# Patient Record
(demographics unavailable — no encounter records)

---

## 2024-11-04 NOTE — HISTORY OF PRESENT ILLNESS
[FreeTextEntry1] : 62YO male with history of TIA and hypertension is referred today from neurology for cardiovascular evaluation. Patient had an episode of headache and LLE numbness in May 2024, during which time he was hospitalized at [facility] for 24 hours. His MRI did not show any acute infarct, but did show evidence of chronic small vessel disease. A few months later, he was hospitalized at an OSH in [place] for an episode of dizziness; at that time, his CVA work up was negative, and his symptoms were attributed to vertigo. He has remained on aspirin 81mg QD, however was taken off a statin due to an allergic reaction. He is also on metoprolol for his blood pressure, which is fairly well controlled. He denies any snoring, day time fatigue, chest pain, dyspnea, palpitations, smoking history, alcohol use, illicit drug use.  Last visit LDL > goal, given allergy to statin, added ezetimibe 10mg QD but he did not start  PMD changed metop to HCTZ due to fatigue

## 2024-11-04 NOTE — ASSESSMENT
[FreeTextEntry1] : EKG NSR normal EKG Zio neg for AFib   A/P 60YO male with history of TIA and hypertension is referred today from neurology for cardiovascular evaluation.  #History of TIA # hyperlipidemia, target LDL < 70 # statin intolerance Patient with history of TIA, without any acute CVA findings on MRI - just chronic small vessel disease.  - On aspirin 81mg QD  - LDL 83 and HDL <40. Will aim for LDL goal <70 given TIA. Given allergy to statin, will initiate ezetimibe 10mg QD.  - zio neg for AF as above - Will defer echo with bubble given absence of acute infarct on MRI   #HTN /80 today. Ambulatory blood pressure readings within goal.  - Continue HCTZ  RTC in 3 months

## 2024-12-11 NOTE — ASSESSMENT
[FreeTextEntry1] : Kody White is a 62 year old male with PMH of HTN, HLD and TIA (5/2024) presents for neurological follow up.  Plan: -Continue Aspirin and Zetia for secondary stroke prevention -Continue aggressive vascular risk factor control with PCP including BP goal <130/80 and LDL goal <70 -TTE with bubble to complete TIA work up -Counselled on healthy eating (DASH/Mediterranean diet, limiting red meats and fried foods) -Counselled on importance of regular exercise and remaining active -Counselled on f/u with PCP regarding regular health maintenance and prevention, including routine screening -Counselled on signs of stroke BEFAST and to call 911 with any new or worsening neurological symptoms -RTO in 3 months to review TTE and repeat lipid panel with Lipoprotein A at that time

## 2024-12-11 NOTE — HISTORY OF PRESENT ILLNESS
[FreeTextEntry1] : Kody White is a 62 year old male with PMH of HTN, HLD and TIA (5/2024) presents for neurological follow up.  Patient reports no new stroke-like symptoms. He was evaluated for dizziness in [facility] in August and again in [place] in 7/2024 with negative imaging and was discharged with Meclizine, which he is no longer taking. He reports compliance with his Aspirin 81 mg, Hydrochlorothiazide 12.5 mg and Zetia 10 mg. BP today 129/76 with BPs in the 110-120/70-80s at home. He reports cutting out bread and rice with minimal red meat and fried foods, resulting in a 35 pound weight loss. He walks for exercise and uses walks about 18,000 steps per day. He reports no issues with sleeping or snoring. He reports no family history of stroke or blood clots. He does not drink or smoke. He reports recent lab work. 5 day Ziopatch in August without evidence of A fib.

## 2025-04-29 NOTE — HISTORY OF PRESENT ILLNESS
[FreeTextEntry1] : Kody White is a 62 year old male with PMH of HTN, HLD and TIA (5/2024) presents for neurological follow up.  Since last visit, patient reports no new stroke-like symptoms. He is tolerating his Aspirin and Zetia without bleeding, bruising or myaglias.  BP: Diet:  Exercise: Labs: TTE:

## 2025-04-29 NOTE — ASSESSMENT
[FreeTextEntry1] : Kody White is a 62 year old male with PMH of HTN, HLD and TIA (5/2024) presents for neurological follow up.  Plan: -Continue Aspirin and Zetia for secondary stroke prevention -Stroke labs today including LpA -Continue aggressive vascular risk factor control with PCP/Cardiology, BP goal <130/80 and LDL goal <70 -TTE with bubble to complete TIA work up -Counselled on healthy eating (DASH/Mediterranean diet, limiting red meats and fried foods) -Counselled on importance of regular exercise and remaining active -Counselled on f/u with PCP regarding regular health maintenance and prevention, including routine screening -Counselled on signs of stroke BEFAST and to call 911 with any new or worsening neurological symptoms -RTO in 6 months

## 2025-05-05 NOTE — ASSESSMENT
[FreeTextEntry1] : EKG NSR normal EKG Zio neg for AFib  A/P 63YO male with history of TIA and hypertension is referred today from neurology for cardiovascular evaluation.  #History of TIA # hyperlipidemia, target LDL < 70 # statin intolerance Patient with history of TIA, without any acute CVA findings on MRI - just chronic small vessel disease. - On aspirin 81mg QD - LDL 83 and HDL <40. Will aim for LDL goal <70 given TIA. Given allergy to statin, last visit added ezetimibe 10mg - repeat labs today  - zio neg for AF as above - Will defer echo with bubble given absence of acute infarct on MRI, pending neuro   #HTN BP at goal  today. Ambulatory blood pressure readings within goal.  - Continue HCTZ  RTC in 3 months.

## 2025-05-05 NOTE — HISTORY OF PRESENT ILLNESS
[FreeTextEntry1] : 63 YO male with history of TIA and hypertension.  024Patient had an episode of headache and LLE numbness in May 2024, during which time he was hospitalized at [facility] for 24 hours. His MRI did not show any acute infarct, but did show evidence of chronic small vessel disease. A few months later, he was hospitalized at an OSH in [place] for an episode of dizziness; at that time, his CVA work up was negative, and his symptoms were attributed to vertigo. He has remained on aspirin 81mg QD, however was taken off a statin due to an allergic reaction. He is also on metoprolol for his blood pressure, which is fairly well controlled. He denies any snoring, day time fatigue, chest pain, dyspnea, palpitations, smoking history, alcohol use, illicit drug use.  Last visit LDL > goal, given allergy to statin, added ezetimibe 10mg QD but he did not start  PMD changed metop to HCTZ due to fatigue